# Patient Record
Sex: FEMALE | Race: ASIAN
[De-identification: names, ages, dates, MRNs, and addresses within clinical notes are randomized per-mention and may not be internally consistent; named-entity substitution may affect disease eponyms.]

---

## 2021-12-30 ENCOUNTER — HOSPITAL ENCOUNTER (OUTPATIENT)
Dept: HOSPITAL 61 - ENDOS | Age: 36
Discharge: HOME | End: 2021-12-30
Attending: INTERNAL MEDICINE
Payer: COMMERCIAL

## 2021-12-30 VITALS — SYSTOLIC BLOOD PRESSURE: 119 MMHG | DIASTOLIC BLOOD PRESSURE: 74 MMHG

## 2021-12-30 VITALS — HEIGHT: 67 IN | BODY MASS INDEX: 28.03 KG/M2 | WEIGHT: 178.57 LBS

## 2021-12-30 VITALS — SYSTOLIC BLOOD PRESSURE: 126 MMHG | DIASTOLIC BLOOD PRESSURE: 81 MMHG

## 2021-12-30 DIAGNOSIS — E78.00: ICD-10-CM

## 2021-12-30 DIAGNOSIS — J45.909: ICD-10-CM

## 2021-12-30 DIAGNOSIS — K63.89: ICD-10-CM

## 2021-12-30 DIAGNOSIS — I10: ICD-10-CM

## 2021-12-30 DIAGNOSIS — E11.9: ICD-10-CM

## 2021-12-30 DIAGNOSIS — K21.9: ICD-10-CM

## 2021-12-30 DIAGNOSIS — K62.5: Primary | ICD-10-CM

## 2021-12-30 DIAGNOSIS — K92.1: ICD-10-CM

## 2021-12-30 DIAGNOSIS — Z79.84: ICD-10-CM

## 2021-12-30 DIAGNOSIS — K64.0: ICD-10-CM

## 2021-12-30 DIAGNOSIS — K63.5: ICD-10-CM

## 2021-12-30 DIAGNOSIS — Z79.899: ICD-10-CM

## 2021-12-30 PROCEDURE — 81025 URINE PREGNANCY TEST: CPT

## 2021-12-30 PROCEDURE — 82962 GLUCOSE BLOOD TEST: CPT

## 2021-12-30 PROCEDURE — 88305 TISSUE EXAM BY PATHOLOGIST: CPT

## 2021-12-30 PROCEDURE — 45380 COLONOSCOPY AND BIOPSY: CPT

## 2022-01-03 NOTE — PATHOLOGY
[facility] Accession Number: [id number]

.                                                                01

Material submitted:                                        .

sigmoid colon - SIGMOID COLON POLYP

.                                                                01

Clinical history:                                          .

RECTAL BLEED

.                                                                02

**********************************************************************

Diagnosis:

Colon biopsies, sigmoid colon polyp:

- Hyperplastic polyp.

([facility]:pit; 01/03/2022)

[facility]  01/03/2022  1244 Local

**********************************************************************

.                                                                02

Comment:

There are no adenomatous changes or evidence of malignancy.

([facility]:pit; 01/03/2022)

.                                                                02

Electronically signed:                                     .

Joe Walters MD, Pathologist

NPI- [id number]

.                                                                01

Gross description:                                         .

The specimen is received in formalin, labeled "Yeo, Wee, sigmoid colon

polyp".  Received is a single segment of pale tan tissue measuring 0.4 cm

in maximum dimensions.  The specimen is entirely submitted in cassette A1.

([facility]; 12/30/2021)

[facility]  12/30/2021 2024 Local

.                                                                02

Pathologist provided ICD-10:

K63.5

.                                                                02

CPT                                                        .

[id number]

Specimen Comment: A courtesy copy of this report has been sent to [phone], [phone]

Specimen Comment: [id number]

Specimen Comment: Report sent to  / DR SLAUGHTER

***Performed at:  01

   [facility]

   [address], [place]  [id number]

   MD Ruben Kuo MD Phone:  [phone]

***Performed at:  02

   [facility]

   [address], [place]  [id number]

   MD Joe Walters MD Phone:  [phone]